# Patient Record
(demographics unavailable — no encounter records)

---

## 2025-02-18 NOTE — ASSESSMENT
[FreeTextEntry1] : Graeme is in good medical health as above with the several issues that are mentioned above. I also need to mention that he had a corneal transplant and also a cataract extraction a month ago with the cataract and these were done approximately 1 month ago.  At least the cataract was 1 month ago the corneal transplant was in the month of year ago perhaps 1 year earlier her 1 year ago. 2..  I do feel that we should probably get his colonoscopy done several months earlier than July which would be the normal month that we would repeat his colonoscopy for the following reasons 3..  His mom had colon cancer at a very early age just above the age of 50 4..  In addition his brother has had several colonic polyps so that a second family member and these occurred when his brother was in his 50s 5.  Finally, his nephew on his mother's side of the family had pancreatic cancer at the very early age of 30. Putting all of those factors together I am advising that he have a colonoscopy perhaps between 6 and 8 weeks from now.  I would not do it immediately because he has just recently had cataract surgery a month ago Finally we will use a Clenpiq bowel preparation and Ghazal will take him through the instructions Finally we discussed potential risks of the procedure including those listed below  AS WE OBTAIN INFORMED CONSENT FOR COLONOSCOPY, UPPER ENDOSCOPY [EGD], OR BOTH PROCEDURES TOGETHER;  As with all procedures, there are risks of which the patient should be aware  1.  Anesthesia; deep sedation with Propofol;  there is a small risk of aspiration and pulmonary infection.  The Anesthesiologist meets with the patient the morning of the procedure to discuss in more detail  2.  risk of bleeding; from removal of a polyp, or rarely, from biopsies, 1 % or less  3.  risk of injury or perforation of the colon or upper GI tract; one in a thousand or less,  from removing a polyp or from advancing the instrument

## 2025-02-18 NOTE — HISTORY OF PRESENT ILLNESS
[FreeTextEntry1] : This is a 61-year-old gentleman basically healthy but with a somewhat complex medical history He presents today to discuss his family history and when it is advisable for him to have another colonoscopy His mother had colon cancer at the age of 52. His nephew or his mother's other grandson had pancreatic cancer at the age of 38 His older brother has had several colonic polyps Graeme had his last colonoscopy 5 years ago in July 2020 and it was negative He has no current GI or lower GI symptoms He has hypertension and is on amlodipine and interestingly the hypertension was discovered when he was in our unit for his last colonoscopy-he subsequently saw his family doctor and was started on medication. He does not use drugs and he does not smoke.  He does occasionally use alcohol but socially and infrequently.  He is definitely not a regular drinker. ROS:  1.  CARDIOVASCULAR: .  no history of exertional chest pain, angina pectoris, shortness of breath, acute coronary syndrome, coronary stent, cardiac surgery, atrial fibrillation or other cardiac arrhythmia   2.  PULMONARY:   no history of pulmonary symptoms such as cough, SOB, asthma, COPD, hemoptysis, pneumonia...he does have sleep apnea, and he uses a cpap machine.  othewise no pulmonary issues   3.  DIABETES;  no history of diabetes either Type 1 or Type 2, use of diabetic medications, use of Insulin, oral diabetic medications, no use of GLP-1 medications for  diabetes or weight loss or both   4.  GENITO URINARY:   no kidney disease,  no renal insufficiency, no recurrent kidney stones, no blood in urine, no symptoms of urinary irritation such as painful urination, urgency, frequency for men; no prostate disease, prostate surgery for women; no history of ovarian cysts or uterine fibroids requiring surgery, no history of ectopic pregnancy   5.  LIVER:  no history of viral hepatitis, no history of MCADAMS, no history of abnormal liver chemistries, no history of chronic hepatitis, no history of bile duct obstruction or jaundice   6.  HEMATOLOGIC no history of anemia, bleeding disorder, abnormal red or white blood cells or platelet disorder, no history of abnormal clotting including excessive clotting   7.  MUSCULOSKELETAL: no history of Rheumatoid  or other auto immune arthritis, no history of auto immune disease such as lupus, no history of joint replacement surgery, no history of back surgery   8.  NEUROLOGIC: no history of seizure, stroke, mini stroke, arm or leg weakness, no history of Parkinsons disease or movement disorder, Migraine headaches, other headache disorders requiring special investigations   8.  DERMATOLOGY; no history of skin cancer, psoriasis or other auto immune skin disorders   9.  ENDOCRINE Patient had a goiter which was causing some respiratory issues by compression of the airway and he underwent a total thyroidectomy because of this and since then he has been on thyroid replacement.   11.  HEENT ; no history of severe headaches, sinus disease, visual disturbances, severe ear infections, throat disorders   12.  MEDICATION USE;   no history of diabetic medications, insulin, diabetic medications such as SGLT-2 INHIBITORS, such as Jardiance no use of blood thinners including anti platelet drugs like aspirin and clopidrogel anti clotting drugs like coumadin and Eliquis anti arrhythmic drugs drugs for weight loss such as GLP-1 INHIBITORS [Ozempic, etc]  13.  Hypertension; use of all anti hypertensive medications:  AMLODIPINE   .

## 2025-05-02 NOTE — CONSULT LETTER
[Dear  ___] : Dear  [unfilled], [Please see my note below.] : Please see my note below. [Consult Closing:] : Thank you very much for allowing me to participate in the care of this patient.  If you have any questions, please do not hesitate to contact me. [Sincerely,] : Sincerely, [FreeTextEntry2] : MD Lon Collado MD [FreeTextEntry1] : Thank you for your kind referral.  We are moving forward with surgical management given the significant involvement of both the internal and external hemorrhoidal columns. [FreeTextEntry3] : Abelardo Pantoja MD F[facility]

## 2025-05-02 NOTE — ASSESSMENT
[FreeTextEntry1] : I discussed with Graeme the advantages disadvantages and limitations of office management.  We discussed the same for surgical management.  Given the involvement of both internal and external hemorrhoidal columns and associated anemia 2/2 blood loss,  I am recommending surgical care which will be definitive.  We are moving forward with surgical scheduling.

## 2025-05-02 NOTE — HISTORY OF PRESENT ILLNESS
[FreeTextEntry1] : Mr. Reyez is a 62 year old male who is here for the evaluation and management of his symptomatic hemorrhoids. Referred by Dr. Chauncey Austin.  PMH of HTN. His mother had colon cancer at the age of 52. His brother had colon polyps. Had a colonoscopy last month.   Colonoscopy 4/2025 -Large grade 4 prolapsing hemorrhoids were noted as well as external hemorrhoids.  -Normal mucosa, NO POLYPS,  a few sigmoid diverticula. -Recall 3 years

## 2025-05-02 NOTE — PHYSICAL EXAM
[FreeTextEntry1] : Anorectal examination with visual, digital rectal exam, anoscopic exam reveals significant 3 quadrant external hemorrhoid chronic engorgement.  Digital rectal exam is normal sphincter tone without palpable mass Anoscopic exam remains reveals large 3 quadrant internal hemorrhoid columns with easy prolapse anatomy.

## 2025-06-25 NOTE — ASSESSMENT
[FreeTextEntry1] : Mr. Reyez is a 62 year old male who is here for a post op visit after surgical hemorrhoidectomy with Dr. Pantoja on 6/11/2025.  Surgical wounds healing without complications. Sutures trimmed at today's visit.  Pt to follow up with Dr. Pantoja in 4 weeks.

## 2025-06-25 NOTE — PHYSICAL EXAM
[FreeTextEntry1] : Physical examination reveals surgical incisions without erythema or purulent discharge.

## 2025-06-25 NOTE — HISTORY OF PRESENT ILLNESS
[FreeTextEntry1] : Mr. Reyez is a 62 year old male who is here for a post op visit after surgical hemorrhoidectomy with Dr. Pantoja on 6/11/2025.  Pt doing well. Reports minimal pain. Has a bowel movement every other day with the aid of colace. Has some scant bleeding. Denies fever, chills, or purulent discharge at surgical site.

## 2025-07-24 NOTE — PHYSICAL EXAM
[FreeTextEntry1] : Anorectal examination reveals surgical incisions healing without complications. No erythema or purulent drainage noted.  Some knobby skin tags without complication related to ongoing surgical healing.

## 2025-07-24 NOTE — ASSESSMENT
[FreeTextEntry1] : Mr. Reyez is a 62 year old male who is here for a post op visit after surgical hemorrhoidectomy on 6/11/2025. Pt's surgical incisions are healing without complications.  Pt to follow up as needed.

## 2025-07-24 NOTE — HISTORY OF PRESENT ILLNESS
[FreeTextEntry1] : Mr. Reyez is a 62 year old male who is here for a post op visit after surgical hemorrhoidectomy on 6/11/2025. Pt doing well. Reports that the drainage from the surgical site has improved. He is having daily bowel movements but feels as though he is not going as much as he used to.  Denies fever, chills, or purulent drainage from surgical incisions.